# Patient Record
(demographics unavailable — no encounter records)

---

## 2025-01-13 NOTE — CONSULT LETTER
[FreeTextEntry1] : Mr. Gamez is a very pleasant 32-year-old male patient who was seen in our office today regarding neck pain and upper extremity symptoms.  The patient endorses an approximate 2 to 4-year history of neck pain.  The patient recalls the onset of his pain around the time of the COVID pandemic.  The patient's pain is primarily in the posterior aspect of his neck radiating into the shoulders bilaterally.  The patient also complains of focal pain at the C7/T1 spinous process at times.  The patient states that his symptoms are longstanding but have been worsening over the last 6 months and more so in the last 3 to 4 weeks.  The patient has attempted physical therapy at the beginning of 2003 with good results.  The patient has new radiating symptoms down the left arm into the third and fourth digit starting over the last several weeks.  The patient's pain worsens with Valsalva maneuvers, spending extended periods of time in the truck, and lifting heavy objects as part of his job.  The patient denies any new bowel or bladder symptoms.  In addition to his neck pain, the patient complains of fairly focal left-sided low back pain.  The patient's pain does not radiate into the lower extremities.  The patient denies any allergies to medications, regular medications, or pertinent medical history.  On examination, the patient is alert, oriented, and compliant with the exam.  The patient demonstrates full strength in the lower extremities bilaterally.  The patient ambulates well.  The patient does not demonstrate a Cecilia sign or ankle clonus.  The patient has remote MRI scans of the lumbar spine performed in 2011 which did not reveal any significant degenerative changes or compressive lesions.  The patient has an MRI scan of the cervical spine dated 2022 which demonstrated narrowing of the left at C5/6 foramen secondary to degenerative changes.  Taken together, the patient has a clinical history and radiographic findings most consistent with multifactorial neck and low back pain.  The patient's neck pain appears primarily a combination of trapezial pain as well as a left-sided radiculopathy possibly at C7.  I have recommended an updated MRI scan of the cervical spine with additional flexion/extension x-rays to better delineate possible sources of the patient's pain.  The patient's lower back pain is more consistent with a focal soft tissue problem or possibly an SI joint problem.  The patient has been recommended an MRI scan of the lumbar spine as well as x-rays of the sacroiliac joint to rule out these possibilities.  The patient will be following up with our office in a few weeks to reevaluate his progress and I look forward to seeing him back at that time.

## 2025-05-28 NOTE — CONSULT LETTER
[Dear  ___] : Dear  [unfilled], [Courtesy Letter:] : I had the pleasure of seeing your patient, [unfilled], in my office today. [Sincerely,] : Sincerely, [FreeTextEntry2] : Tomas[place] [id number] [place] [address] [FreeTextEntry1] : eJimy is a very pleasant 32-year-old male patient who was seen in our office today in follow-up to review imaging findings in the context of neurologic symptoms.  The patient's primary concern is neck pain.  The patient's neck pain resides in the posterior aspect of the neck and into the shoulders bilaterally occasionally going into the thoracic spine.  This distribution is most consistent with trapezius muscle dysfunction.  The patient additionally complains of occasional numbness in the forearm and in the 3rd and 4th digits of the left hand.  However, this is not a primary concern for the patient.  The patient continues to complain of focal pain in the lumbar spine towards the left without radicular symptoms. The patient continues to endorse an association with anxiety and stress. The patient also complains of urinary frequency which has been investigated by urology.  The patient now endorses intermittent numbness in the left calf which she did not previously.  On examination, the patient remains alert, oriented, and compliant with the exam. The patient demonstrates full strength in the upper and lower extremities bilaterally.  The patient ambulates well.  The patient has good range of motion of the cervical spine but does complain of pain and discomfort.  The patient is accompanied with an MRI scan of the cervical spine dated January 27, 2025.  These images demonstrate moderate degenerative changes from C4-C7 without overt nerve root or spinal cord compression.  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 These imaging findings are similar to the patient's previous imaging from 2022.  Taken together, the patient has a clinical history and radiographic findings most consistent with chronic musculoskeletal neck pain.  The patient has follow-up with a pain management physician in the next few days to hopefully help him with symptomatic relief.  I explained to the patient that his muscular dysfunction is likely related to heightened anxiety and stress and have recommended follow-up with a neurologist to address this.  Given the lack of a structural lesion in the cervical spine, the patient may also have an underlying systemic neurologic disorder which I have recommended neurology follow-up as well.  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  Moreover, surgical intervention to decompress the C6 nerve root would unlikely address his bilateral posterior cervical neck pain.The  patient will be following up with our office on an as-needed basis depending on his clinical progression. [FreeTextEntry3] : Nathaniel Pérez MD, PhD, FRCPSC   Attending Neurosurgeon  [address], [address] [place] [address]  Office: [phone]  Fax: [phone]